# Patient Record
(demographics unavailable — no encounter records)

---

## 2018-12-31 NOTE — RAD REPORT
EXAM DESCRIPTION:  CTAbdomen   Pelvis W Contrast - 12/31/2018 5:31 am

 

CLINICAL HISTORY:  Abdominal pain.

iv only;Abd pain

 

COMPARISON:  CT ABD PELVIS W CONTRAST dated 5/30/2012

 

TECHNIQUE:  Biphasic CT imaging of the abdomen and pelvis was performed with 100 ml non-ionic IV cont
rast.

 

All CT scans are performed using dose optimization technique as appropriate and may include automated
 exposure control or mA/KV adjustment according to patient size.

 

FINDINGS:  The lung bases are clear.Small paraesophageal hiatal hernia.

 

The liver, spleen, pancreas, adrenal glands and kidneys are within normal limits.

 

No bowel obstruction, free air, free fluid or abscess.  The appendix is normal.  No evidence of signi
ficant lymphadenopathy.

 

No suspicious bony findings.

 

IMPRESSION:  No acute intra-abdominal or pelvic finding.

## 2018-12-31 NOTE — EDPHYS
Physician Documentation                                                                           

 [facility]                                                                

Name: Yohannes Rosenberg Jr                                                                             

Age: 19 yrs                                                                                       

Sex: Male                                                                                         

: 1999                                                                                   

MRN: [id number]                                                                                   

Arrival Date: 2018                                                                          

Time: 00:21                                                                                       

Account#: [id number]                                                                            

Bed 5                                                                                             

Private MD:                                                                                       

ED Physician Collins Hemphill                                                                       

HPI:                                                                                              

                                                                                             

01:19 This 19 yrs old  Male presents to ER via Ambulatory with complaints of Flank    jr8 

      Pain.                                                                                       

01:19 The patient complains of pain in the left flank. The pain does not radiate. Onset: The  jr8 

      symptoms/episode began/occurred acutely, today. Modifying factors: The symptoms are         

      alleviated by nothing. the symptoms are aggravated by nothing. Associated signs and         

      symptoms: The patient has no apparent associated signs or symptoms. Severity of pain:       

      At its worst the pain was mild in the emergency department the pain is unchanged. The       

      patient has not experienced similar symptoms in the past. The patient has not recently      

      seen a physician.                                                                           

                                                                                                  

Historical:                                                                                       

- Allergies:                                                                                      

00:34 No Known Allergies;                                                                     bb  

- Home Meds:                                                                                      

00:34 None [Active];                                                                          bb  

- PMHx:                                                                                           

00:34 premature birth;                                                                        bb  

- PSHx:                                                                                           

00:34 trachiostomy (removed); bowels untwisted; peg tube (removed); eye surgery;              bb  

                                                                                                  

- Immunization history:: Adult Immunizations up to date.                                          

- Social history:: Smoking status: Patient/guardian denies using tobacco.                         

- Ebola Screening: : No symptoms or risks identified at this time.                                

                                                                                                  

                                                                                                  

ROS:                                                                                              

01:19 Eyes: Negative for injury, pain, redness, and discharge, ENT: Negative for injury,      jr8 

      pain, and discharge, Neck: Negative for injury, pain, and swelling, Cardiovascular:         

      Negative for chest pain, palpitations, and edema, Respiratory: Negative for shortness       

      of breath, cough, wheezing, and pleuritic chest pain, Abdomen/GI: Negative for              

      abdominal pain, nausea, vomiting, diarrhea, and constipation, MS/Extremity: Negative        

      for injury and deformity, Skin: Negative for injury, rash, and discoloration, Neuro:        

      Negative for headache, weakness, numbness, tingling, and seizure.                           

01:19 Back: Positive for flank pain, on the left.                                                 

                                                                                                  

Exam:                                                                                             

01:19 Eyes:  Pupils equal round and reactive to light, extra-ocular motions intact.  Lids and jr8 

      lashes normal.  Conjunctiva and sclera are non-icteric and not injected.  Cornea within     

      normal limits.  Periorbital areas with no swelling, redness, or edema. ENT:  Nares          

      patent. No nasal discharge, no septal abnormalities noted.  Tympanic membranes are          

      normal and external auditory canals are clear.  Oropharynx with no redness, swelling,       

      or masses, exudates, or evidence of obstruction, uvula midline.  Mucous membranes           

      moist. Neck:  Trachea midline, no thyromegaly or masses palpated, and no cervical           

      lymphadenopathy.  Supple, full range of motion without nuchal rigidity, or vertebral        

      point tenderness.  No Meningismus. Cardiovascular:  Regular rate and rhythm with a          

      normal S1 and S2.  No gallops, murmurs, or rubs.  Normal PMI, no JVD.  No pulse             

      deficits. Respiratory:  Lungs have equal breath sounds bilaterally, clear to                

      auscultation and percussion.  No rales, rhonchi or wheezes noted.  No increased work of     

      breathing, no retractions or nasal flaring. Back:  No spinal tenderness.  No                

      costovertebral tenderness.  Full range of motion. Skin:  Warm, dry with normal turgor.      

      Normal color with no rashes, no lesions, and no evidence of cellulitis. MS/ Extremity:      

      Pulses equal, no cyanosis.  Neurovascular intact.  Full, normal range of motion. Neuro:     

       Awake and alert, GCS 15, oriented to person, place, time, and situation.  Cranial          

      nerves II-XII grossly intact.  Motor strength 5/5 in all extremities.  Sensory grossly      

      intact.  Cerebellar exam normal.  Normal gait.                                              

01:19 Abdomen/GI: Inspection: scar(s), are noted in the , Bowel sounds: active, all               

      quadrants, Palpation: soft, in all quadrants, mild abdominal tenderness, in the             

      anterior aspect of left lateral abdomen and posterior aspect of left lateral abdomen,       

      mass, is not appreciated, rebound tenderness, is not appreciated, voluntary guarding,       

      is not appreciated, involuntary guarding, is not appreciated, no appreciated                

      organomegaly, Indicators: McBurney's point is not tender, Louis's sign is negative,        

      Rovsing's sign is negative, Liver: tenderness, is not appreciated.                          

                                                                                                  

Vital Signs:                                                                                      

00:34  / 82; Pulse 92; Resp 16 S; Temp 98.3(O); Pulse Ox 99% on R/A; Weight 46.72 kg    bb  

      (R); Height 4 ft. 11 in. (149.86 cm) (R); Pain 9/10;                                        

00:34 Body Mass Index 20.80 (46.72 kg, 149.86 cm)                                             bb  

                                                                                                  

MDM:                                                                                              

00:26 Patient medically screened.                                                             [facility] 

04:07 Transition of care: After a detail discussion of the patient's case, care is            jr8 

      transferred to Collins Hemphill MD.                                                            

05:10 Data reviewed: vital signs, nurses notes, lab test result(s), radiologic studies.       gs  

      Response to treatment: the patient's symptoms have resolved after treatment, and as a       

      result, I will discharge patient. ED course: pt seen and examined non surgical abdomen      

      no vomiting.                                                                                

                                                                                                  

                                                                                             

00:39 Order name: Basic Metabolic Panel                                                       8 

                                                                                             

00:39 Order name: CBC with Diff; Complete Time:                                          jr8 

                                                                                             

00:39 Order name: Creatinine for Radiology; Complete Time:                               jr8 

                                                                                             

00:39 Order name: Hepatic Function; Complete Time: :49                                      jr8 

                                                                                             

00:39 Order name: Lipase; Complete Time:                                                 jr8 

                                                                                             

00:39 Order name: Basic Metabolic Panel; Complete Time: 01:49                                 EDMS

                                                                                             

00:39 Order name: IV Saline Lock; Complete Time: :11                                        jr8 

                                                                                             

00:39 Order name: Labs collected and sent; Complete Time: :11                               jr8 

                                                                                             

01:01 Order name: Urine Dipstick--Ancillary (enter results); Complete Time: 02:16             ak1 

                                                                                             

01:50 Order name: CT Abd/Pelvis - W/Contrast; Complete Time: 15:16                             

                                                                                                  

Administered Medications:                                                                         

No medications were administered                                                                  

                                                                                                  

                                                                                                  

Disposition:                                                                                      

05:10 Co-signature as Attending Physician, Collins Hemphill MD.                                    

                                                                                                  

Disposition:                                                                                      

18 05:12 Discharged to Home. Impression: Abdominal and pelvic pain.                         

- Condition is Stable.                                                                            

- Discharge Instructions: Clear Liquid Diet, Adult, Abdominal Pain, Adult, Easy-to-Read.          

- Prescriptions for Zofran 4 mg Oral Tablet - take 1 tablet by ORAL route every 12                

  hours As needed; 6 tablet.                                                                      

- Medication Reconciliation Form, Thank You Letter, Antibiotic Education, Prescription            

  Opioid Use form.                                                                                

- Follow up: Private Physician; When: 2 - 3 days; Reason: Re-evaluation by your                   

  physician.                                                                                      

                                                                                                  

                                                                                                  

                                                                                                  

Signatures:                                                                                       

Dispatcher [facility]                           Sue Hicks, RN                     RN   Jose G Jauregui PA PA   jr8                                                  

Jaqueline Nagel RN                       RN   ak1                                                  

Collins Hemphill MD MD gs                                                   

                                                                                                  

Corrections: (The following items were deleted from the chart)                                    

05:24 05:12 2018 05:12 Discharged to Home. Impression: Abdominal and pelvic pain.       ak1 

      Condition is Stable. Forms are Medication Reconciliation Form, Thank You Letter,            

      Antibiotic Education, Prescription Opioid Use. Follow up: Private Physician; When: 2 -      

      3 days; Reason: Re-evaluation by your physician. gs                                         

                                                                                                  

**************************************************************************************************

## 2020-03-13 NOTE — EDPHYS
Physician Documentation                                                                           

 [facility]                                                                 

Name: Yohannes Rsoenberg Jr                                                                             

Age: 20 yrs                                                                                       

Sex: Male                                                                                         

: 1999                                                                                   

MRN: [id number]                                                                                   

Arrival Date: 2020                                                                          

Time: 10:49                                                                                       

Account#: [id number]                                                                            

Bed 25                                                                                            

Private MD:                                                                                       

ED Physician Pipo London                                                                       

HPI:                                                                                              

                                                                                             

11:15 This 20 yrs old  Male presents to ER via Ambulatory with complaints of Ingrown  cp  

      Toenail.                                                                                    

11:15 Onset: The symptoms/episode began/occurred at an unknown time. Associated signs and     cp  

      symptoms: Pertinent negatives: fever.                                                       

                                                                                                  

Historical:                                                                                       

- Allergies:                                                                                      

11:02 No Known Allergies;                                                                     aj1 

- Home Meds:                                                                                      

11:02 None [Active];                                                                          aj1 

- PMHx:                                                                                           

11:02 Premature birth;                                                                        aj1 

- PSHx:                                                                                           

11:02 "surgery for twisted intestines";                                                       aj1 

                                                                                                  

- Immunization history:: Flu vaccine is up to date.                                               

- Social history:: Smoking status: Patient denies any tobacco usage or history of.                

                                                                                                  

                                                                                                  

ROS:                                                                                              

11:20 Eyes: Negative for injury, pain, redness, and discharge.                                cp  

11:20 Constitutional: Negative for fever, poor PO intake.                                         

11:20 Cardiovascular: Negative for chest pain.                                                    

11:20 Respiratory: Negative for cough.                                                            

11:20 MS/extremity: Positive for pain, of the right great toe, Negative for injury or acute       

      deformity, decreased range of motion.                                                       

11:20 All other systems are negative.                                                             

                                                                                                  

Exam:                                                                                             

11:30 Constitutional: The patient appears in no acute distress, alert, awake, non-toxic, well cp  

      developed, well nourished.                                                                  

11:30 Musculoskeletal/extremity: Extremities: grossly normal except: noted in the right great     

      toe: erythema, pain, swelling, tenderness, ROM: full active range of motion, in the         

      right great toe, Perfusion: the extremity is normally perfused throughout, Sensation        

      intact.                                                                                     

11:30 Skin: cellulitis, that is mild, on the  along lateral aspect nail right great toe.      cp  

                                                                                                  

Vital Signs:                                                                                      

10:59  / 71; Pulse 96; Resp 18; Temp 97.5; Pulse Ox 98% on R/A; Weight 47.63 kg (R);    aj1 

      Height 4 ft. 11 in. (149.86 cm) (R); Pain 0/10;                                             

10:59 Body Mass Index 21.21 (47.63 kg, 149.86 cm)                                             aj1 

                                                                                                  

MDM:                                                                                              

11:04 Patient medically screened.                                                             cp  

12:00 Differential diagnosis: abscess, cellulitis, ingrown nail.                              cp  

12:18 Data reviewed: vital signs, nurses notes, and as a result, I will discharge patient.    cp  

12:18 Counseling: I had a detailed discussion with the patient and/or guardian regarding: the cp  

      historical points, exam findings, and any diagnostic results supporting the                 

      discharge/admit diagnosis, the need for outpatient follow up, a family practitioner, to     

      return to the emergency department if symptoms worsen or persist or if there are any        

      questions or concerns that arise at home. Response to treatment: the patient's symptoms     

      have markedly improved after treatment.                                                     

                                                                                                  

                                                                                             

11:10 Order name: I\T\D Setup; Complete Time: 11:45                                             cp

                                                                                             

12:18 Order name: Wound dressing; Complete Time: 12:42                                        cp  

                                                                                                  

Administered Medications:                                                                         

11:55 Drug: Marcaine (0.5 %) 10 ml {Note: administered by OZZY Lucio} Volume: 10 ml; Route: aj1 

      Infiltration;                                                                               

11:55 Drug: Lidocaine (1 %) 10 ml {Note: Administered by OZZY Lucio} Volume: 5 ml; Route:   aj1 

      Infiltration;                                                                               

                                                                                                  

                                                                                                  

Disposition:                                                                                      

12:50 Chart complete.                                                                         cp  

                                                                                                  

Disposition:                                                                                      

20 12:19 Discharged to Home. Impression: Ingrowing nail - right great toe.                  

- Condition is Stable.                                                                            

- Discharge Instructions: Ingrown Toenail, Fingernail or Toenail Removal, Adult,                  

  Fingernail or Toenail Removal, Care After.                                                      

- Prescriptions for Ibuprofen 600 mg Oral Tablet - take 1 tablet by ORAL route every 6            

  hours As needed take with food; 30 tablet. Bactrim - 160 mg Oral Tablet - take            

  1 tablet by ORAL route every 12 hours for 10 days; 20 tablet.                                   

- Medication Reconciliation Form, Thank You Letter, Antibiotic Education, Prescription            

  Opioid Use form.                                                                                

- Follow up: Private Physician; When: 2 - 3 days; Reason: Wound Recheck.                          

- Problem is new.                                                                                 

- Symptoms have improved.                                                                         

                                                                                                  

                                                                                                  

                                                                                                  

Addendum:                                                                                         

2020                                                                                        

     10:44 Co-signature as Attending Physician, Pipo London MD I agree with the assessment and   k
dr

           plan of care.                                                                          

                                                                                                  

Signatures:                                                                                       

Mildred Reyes RN                     RN   aj1                                                  

Rittger, Pipo, MD                      Campos Mcclure PA                         PA   cp                                                   

                                                                                                  

Corrections: (The following items were deleted from the chart)                                    

                                                                                             

12:44 12:19 2020 12:19 Discharged to Home. Impression: Ingrowing nail - right great     aj1 

      toe. Condition is Stable. Forms are Medication Reconciliation Form, Thank You Letter,       

      Antibiotic Education, Prescription Opioid Use. Follow up: Private Physician; When: 2 -      

      3 days; Reason: Wound Recheck. Problem is new. Symptoms have improved. cp                   

                                                                                                  

**************************************************************************************************

## 2020-03-13 NOTE — XMS REPORT
Patient Summary Document

 Created on:2020



Patient:EMERITA SCHAFER NMMACO

Sex:Male

:1999

External Reference #:[id number]





Demographics







 Address  [address]



   [place] [address]

 

 Home Phone  [phone]

 

 Email Address  NONE

 

 Preferred Language  Unknown

 

 Marital Status  Unknown

 

 Sabianism Affiliation  Unknown

 

 Race  Unknown

 

 Additional Race(s)  Unavailable

 

 Ethnic Group  Unknown









Author







 Organization  [facility]

 

 Address  [address]



   [place] [address]

 

 Phone  [phone]









Care Team Providers







 Name  Role  Phone

 

 DR LEVI CHAPMAN  Unavailable  Unavailable

 

 BA, DR ROWE  Unavailable  Unavailable

 

 SMITH, DR MACKEY  Unavailable  Unavailable

 

 HELEN SMITH  Unavailable  Unavailable









Problems

This patient has no known problems.



Allergies, Adverse Reactions, Alerts

This patient has no known allergies or adverse reactions.



Medications

This patient has no known medications.



Encounters







 Start  End  Encounter  Admission  Attending  Care  Care  Encounter



 Date/Time  Date/Time  Type  Type  Clinicians  Facility  Department  ID

 

 2020  Outpatient  E  ADELA  [facility]  [id number]



 18:26:00  18:50:00      [place]      

 

 2019-12-15  2019-12-15  Outpatient  E  SOLEDAD CARVER  [facility]  [id number]



 10:06:00  11:13:00            

 

 2017  Emergency  E  NARENDRA SMITH  [facility]  [id number]



 14:00:00  15:00:00            







Results







 Test Description  Test Time  Test Comments  Text Results  Atomic Results  
Result Comments









 XR SPINE LUMBAR COMPLETE  2019-12-15 10:34:39    EXAM: Lumbar spine series, 5  



 *OW*      viewsDictation location:  



       [id number]: PainCOMPARISON:  



       None.DISCUSSION: Frontal, bilateral  



       oblique, spot lateral, and lateral  



       views of thelumbar spine are  



       submitted. There are 5 lumbar-type  



       non-rib-bearing vertebralbodies. No  



       fracture or osteolytic or  



       osteoblastic lesions are  



       identified. Discspace height is  



       well preserved. Facet joints are  



       unremarkable. No spondylolysisor  



       spondylolisthesis is seen.  



       Straightening of lumbar lordosis is  



       noted.IMPRESSION: Straightening of  



       lumbar lordosis. Otherwise,  



       unremarkable lumbarspine  



       radiographs.  









 URINALYSIS W/O MICROSCOPIC**OW**  2019-12-15 10:27:00    









   Test Item  Value  Reference Range  Comments









 COLOR (test code=COLU)  Yellow  YELLOW  

 

 CLARITY (test code=CLA)  Clear  CLEAR  

 

 GLUCOSE UR (test code=UA GLUCOSE)  Negative  NEGATIVE  

 

 BILI UR (test code=BILE)  Negative  NEGATIVE  

 

 KETONES UR (test code=ACE)  Negative  NEGATIVE  

 

 SP GRAVITY (test code=SPGR)  1.020  1.005-1.030  

 

 PH UR (test code=PH)  7.0  4.5-8.0  

 

 PROTEIN UR (test code=PU)  Negative  NEGATIVE  

 

 NITRITE UR (test code=NITRITE)  Negative  NEGATIVE  

 

 UROBIL UR (test code=GUROQ)  2.0 E.U./dL    

 

 UROBIL UR (test code=GUROQC)  ***** UROBILINOGEN REFERENCE RANGE    



   *****    



     0.2 - 1.0 EU/dL    

 

 BLOOD UR (test code=UA BLOOD)  Negative  NEGATIVE  

 

 LEUK ES UR (test code=LEUK)  Negative  NEGATIVE  



CT ABDOMEN AND PELVIS WITH [id number] 13:08:17LOCATION: [id number]: 17-
year-old male presents with right lower quadrant abdominal pain.COMMENT: Field 
3Axial CT imaging of this patient's abdomen and pelvis was obtained during 
IVcontrast injection. Coronal and sagittal soft tissue reconstructions 
wereincluded. An older examination of 09/06/15 is available for comparison.One 
or more of the following dose reduction techniques are used: Automatedexposure 
control, adjustment of the mA and/or kV according the patient size,and/or 
utilization of iterative reconstruction technique.DLP: 780 mGy-cmCONTRAST: 98 
mL of Omnipaque 300 nonionic contrast was injected patient's righthand. Serum 
creatinine level was 0.9.FINDINGS:The lung bases are clear. The cardiac 
silhouette is unremarkable.The liver, spleen, pancreas, adrenal glands, kidneys
, and gallbladder areunremarkable.The upper intestinal tract and small 
intestine are unremarkable. Anormal-appearing appendix is seen.The colon is 
unremarkable.There is no ascites or adenopathy seen in the abdomen or 
thepelvis.In the pelvis the urinary bladder, prostate gland, seminal vesicles 
areunremarkable.The vascular anatomy is unremarkable.The musculoskeletal 
anatomy is unremarkable.IMPRESSION:Unremarkable CT examination of the abdomen 
and pelvis.AMYLASE AND [id number] 12:28:00





 Test Item  Value  Reference Range  Comments

 

 AMYLASE (test code=10A)  58 U/L    

 

 LIPASE (test code=60A)  63 IU/L    



COMPREHENSIVE METABOLIC [id number] 12:28:00





 Test Item  Value  Reference Range  Comments

 

 GLUCOSE (test code=06D)  92 mg/dL    

 

 SODIUM (test code=01A)  143 mmol/L  136-145  

 

 POTASSIUM (test code=01B)  4.9 mmol/L  3.6-5.1  

 

 CHLORIDE (test code=04A)  105 mmol/L    

 

 CO2 (test code=02A)  29 mmol/L  22-32  

 

 ANION GAP (test code=ANG)  13.9 mmol/L    

 

 BUN (test code=05D)  14 mg/dL  7-18  

 

 CREATININE (test code=03E)  0.9 mg/dL  0.7-1.3  

 

 BUN/CREA R (test code=BCR)  15  12-20  

 

 CALCIUM (test code=09D)  8.9 mg/dL  8.3-9.5  

 

 BILI TOTAL (test code=11A)  1.2 mg/dL  0.2-1.0  

 

 PROTEIN (test code=07D)  7.3 g/dL  6.0-8.0  

 

 ALBUMIN (test code=08D)  4.1 g/dL  3.5-4.8  

 

 GLOBULIN (test code=GLB)  3.2 g/dL  1.5-3.8  

 

 ALB/GLOB (test code=AGRR)  1.3  1.0-2.6  

 

 ALK PHOS (test code=35A)  95 IU/L    

 

 AST (test code=30A)  28 IU/L  <=42  

 

 ALT (test code=31A)  26 IU/L  <=78  



URINALYSIS WITH [id number] 12:12:00





 Test Item  Value  Reference Range  Comments

 

 COLOR (test code=COLU)  YELLOW  YELLOW  

 

 CLARITY (test code=CLA)  CLOUDY  CLEAR  

 

 GLUCOSE UR (test code=UA GLUCOSE)  NEGATIVE  NEGATIVE  

 

 BILI UR (test code=BILE)  NEGATIVE  NEGATIVE  

 

 KETONES UR (test code=ACE)  NEGATIVE  NEGATIVE  

 

 SP GRAVITY (test code=SPGR)  1.018  1.005-1.030  

 

 PH UR (test code=PH)  7.5  4.5-8.0  

 

 PROTEIN UR (test code=PU)  TRACE  NEGATIVE  

 

 UROBIL UR (test code=UROQ)  1.0 EU/dL  0.2-1.0  

 

 NITRITE UR (test code=NITRITE)  NEGATIVE  NEGATIVE  

 

 BLOOD UR (test code=UA BLOOD)  NEGATIVE  NEGATIVE  

 

 LEUK ES UR (test code=LEUK)  1+  NEGATIVE  

 

 WBC UR (test code=UWBC)  2 /HPF  0-3  

 

 RBC UR (test code=URBC)  0 /HPF  0-2  

 

 EPITH  UR (test code=UEPC)  FEW /LPF  NONE  

 

 BACTERIA UR (test code=UBACT)  MODERATE /HPF  NONE  

 

 CAST UR (test code=CAST)   /LPF  NONE  

 

 CRYSTAL UR (test code=CRYU)   / LPF  NONE  

 

 MUCUS UR (test code=MUC)   / HPF  NONE  

 

 AMORPH UR (test code=MARIELLE)   / HPF  NONE  

 

 TRICH UR (test code=UTRICH)   /HPF  NONE  

 

 YEAST UR (test code=UY)   /HPF  NONE  

 

 SPERM UR (test code=USPERM)   /HPF  NONE  



CBC (INCLUDES AUTOMATED DIFFERENTIAL)2017 12:10:00





 Test Item  Value  Reference Range  Comments

 

 WBC (test code=WBC)  9.5 10\S\3/uL  4.5-13.0  

 

 RBC (test code=RBC)  5.39 10\S\6/uL  4.10-5.20  

 

 HGB (test code=HBG)  16.5 g/dL  11.5-15.5  

 

 HCT (test code=HCT)  45.3 %  35.0-45.0  

 

 MCV (test code=MCV)  84.0 fL  77.0-95.0  

 

 MCH (test code=MCH)  30.6 pg  25.0-33.0  

 

 MCHC (test code=MCHC)  36.4 g/dL  31.0-37.0  

 

 RDW (test code=RDW)  12.1 %  11.5-14.5  

 

 PLT (test code=PLT)  281 10\S\3/uL  130-400  

 

 MPV (test code=MPV)  9.8 fL  9.4-12.4  

 

 NEUTROP # (test code=NE#)  6.5 10\S\3/uL  2.0-8.0  

 

 LYMPH # (test code=LY#)  2.3 10\S\3/uL  1.2-4.0  

 

 MONOCYTE # (test code=MO#)  0.6 10\S\3/uL  0.0-1.1  

 

 EOSINOPH # (test code=EO#)  0.1 10\S\3/uL  0.0-0.7  

 

 BASOPHIL # (test code=BA#)  0.1 10\S\3/uL  0.0-0.3  

 

 IG # (test code=IG#)  0.03 10\S\3/uL  0.00-0.06  

 

 NRBC # (test code=NRBC#)  0.00 10\S\3/uL  0.00-0.01  

 

 NEUTROPH % (test code=NE%)  68.1 %  35.0-73.0  

 

 LYMPH % (test code=LY%)  24.0 %  20.0-55.0  

 

 MONO % (test code=MO%)  6.2 %  2.5-10.0  

 

 EOSINOPH % (test code=EO%)  0.9 %  0.0-5.0  

 

 BASOPHIL % (test code=BA%)  0.5 %  0.0-2.0  

 

 IG % (test code=IG%)  0.3 %  0.0-0.8  

 

 NRBC% (test code=NRBC%)  0.0 %  0.0-0.2  

 

 MANDIFF (test code=MDIFF)  NO  NO  

 

 RBC MORPH (test code=RBCMOR)    NORMAL

## 2020-03-13 NOTE — ER
Nurse's Notes                                                                                     

 [facility]                                                                 

Name: Yohannes Rosenberg Jr                                                                             

Age: 20 yrs                                                                                       

Sex: Male                                                                                         

: 1999                                                                                   

MRN: [id number]                                                                                   

Arrival Date: 2020                                                                          

Time: 10:49                                                                                       

Account#: [id number]                                                                            

Bed 25                                                                                            

Private MD:                                                                                       

Diagnosis: Ingrowing nail-right great toe                                                         

                                                                                                  

Presentation:                                                                                     

                                                                                             

10:59 Chief complaint: Parent and/or Guardian states: He has an ingrown toe nail, today they  aj1 

      noticed some blood on pus draining from it. Coronavirus screen: The patient has NOT         

      traveled to a country currently being monitored by the [facility] within the last 14 days.         

      Ebola Screen: Patient denies travel to an Ebola-affected area in the 21 days before         

      illness onset. Initial Sepsis Screen: Does the patient meet any 2 criteria? HR > 90         

      bpm. No. Patient's initial sepsis screen is negative. Does the patient have a suspected     

      source of infection? Yes: Other: infected ingrown toenail. Risk Assessment: Do you want     

      to hurt yourself or someone else? Patient reports no desire to harm self or others.         

10:59 Method Of Arrival: Ambulatory                                                           aj1 

10:59 Acuity: NICA 4                                                                           aj1 

                                                                                                  

Triage Assessment:                                                                                

11:02 General: Appears in no apparent distress. comfortable, Behavior is calm, cooperative,   aj1 

      appropriate for age. Pain: Denies pain.                                                     

                                                                                                  

Historical:                                                                                       

- Allergies:                                                                                      

11:02 No Known Allergies;                                                                     aj1 

- Home Meds:                                                                                      

11:02 None [Active];                                                                          aj1 

- PMHx:                                                                                           

11:02 Premature birth;                                                                        aj1 

- PSHx:                                                                                           

11:02 "surgery for twisted intestines";                                                       aj1 

                                                                                                  

- Immunization history:: Flu vaccine is up to date.                                               

- Social history:: Smoking status: Patient denies any tobacco usage or history of.                

                                                                                                  

                                                                                                  

Screenin:05 Abuse screen: Denies threats or abuse. Denies injuries from another. Nutritional        aj1 

      screening: No deficits noted. Tuberculosis screening: No symptoms or risk factors           

      identified.                                                                                 

12:43 Fall Risk None identified.                                                              aj1 

                                                                                                  

Assessment:                                                                                       

11:05 General: Appears in no apparent distress. comfortable, Behavior is calm, cooperative,   aj1 

      appropriate for age. Pain: Denies pain. Neuro: Level of Consciousness is awake, alert,      

      obeys commands, Oriented to person, place, time, situation. Cardiovascular: Patient's       

      skin is warm and dry. Respiratory: Airway is patent Respiratory effort is even,             

      unlabored, Respiratory pattern is regular, symmetrical. GI: No signs and/or symptoms        

      were reported involving the gastrointestinal system. : No signs and/or symptoms were      

      reported regarding the genitourinary system. EENT: No signs and/or symptoms were            

      reported regarding the EENT system. Derm: Skin is pink, warm \T\ dry. normal, ingrown toe   

      nail to right great toe. Musculoskeletal: No signs and/or symptoms reported regarding       

      the musculoskeletal system. Circulation, motion, and sensation intact.                      

12:05 Reassessment: Patient appears in no apparent distress at this time. No changes from     aj1 

      previously documented assessment. Patient and/or family updated on plan of care and         

      expected duration. Pain level reassessed. Patient is alert, oriented x 3, equal             

      unlabored respirations, skin warm/dry/pink.                                                 

                                                                                                  

Vital Signs:                                                                                      

10:59  / 71; Pulse 96; Resp 18; Temp 97.5; Pulse Ox 98% on R/A; Weight 47.63 kg (R);    aj1 

      Height 4 ft. 11 in. (149.86 cm) (R); Pain 0/10;                                             

10:59 Body Mass Index 21.21 (47.63 kg, 149.86 cm)                                             aj1 

                                                                                                  

ED Course:                                                                                        

10:49 Patient arrived in ED.                                                                  rg4 

10:59 Mildred Reyes, RN is Primary Nurse.                                                   aj1 

11:00 Triage completed.                                                                       aj1 

11:02 Arm band placed on.                                                                     aj1 

11:03 Campos Culver PA is PHCP.                                                                cp  

11:03 Pipo London MD is Attending Physician.                                              cp  

11:05 Patient has correct armband on for positive identification. Bed in low position. Call   aj1 

      light in reach. Side rails up X 1.                                                          

11:05 No provider procedures requiring assistance completed.                                  aj1 

12:43 Patient did not have IV access during this emergency room visit.                        aj1 

                                                                                                  

Administered Medications:                                                                         

11:55 Drug: Marcaine (0.5 %) 10 ml {Note: administered by OZZY Lucio} Volume: 10 ml; Route: aj1 

      Infiltration;                                                                               

11:55 Drug: Lidocaine (1 %) 10 ml {Note: Administered by OZZY Lucio} Volume: 5 ml; Route:   aj1 

      Infiltration;                                                                               

                                                                                                  

                                                                                                  

Outcome:                                                                                          

12:19 Discharge ordered by MD.                                                                cp  

12:43 Discharged to home ambulatory.                                                          aj1 

12:43 Condition: good                                                                             

12:43 Discharge instructions given to patient, family, Instructed on discharge instructions,      

      follow up and referral plans. medication usage, Demonstrated understanding of               

      instructions, follow-up care, medications, Prescriptions given X 1.                         

12:44 Patient left the ED.                                                                    aj1 

                                                                                                  

Signatures:                                                                                       

Mildred Reyes RN                     RN   aj1                                                  

Campos Culver PA PA cp Garcia, Rubi                                 rg4                                                  

                                                                                                  

Corrections: (The following items were deleted from the chart)                                    

11:55 11:55 Lidocaine (1 %) 10 ml 5 ml Infiltration 5 ml aj1                                  aj1 

                                                                                                  

**************************************************************************************************